# Patient Record
(demographics unavailable — no encounter records)

---

## 2024-10-17 NOTE — PHYSICAL EXAM
[No Carotid Bruits] : no carotid bruits [No Abdominal Bruit] : a ~M bruit was not heard ~T in the abdomen [No Palpable Aorta] : no palpable aorta [Normal] : soft, non-tender, non-distended, no masses palpated, no HSM and normal bowel sounds [Normal Inguinal Nodes] : no inguinal lymphadenopathy [Normal Femoral Nodes] : no femoral lymphadenopathy [de-identified] : Trace edema [de-identified] : Half centimeter avulsion healing well below 5 cm avulsion injury healing well no discharge minimal redness

## 2024-10-17 NOTE — HISTORY OF PRESENT ILLNESS
[FreeTextEntry8] :   CC: Avulsion injury of the skin of the right shin  HPI: The patient is a healthy 72-year-old female with history of hypothyroidism, macrocytosis, Hyperlipidemia who presents for dangle her leg against the bed with avulsion injury to the skin of right shin patient was seen in urgent care center diagnosed with secondary cellulitis started on Cefadroxil with improvement now presents for follow-up patient notes some tenderness mild swelling minimal redness no discharge no fever chills.

## 2024-10-17 NOTE — ASSESSMENT
[FreeTextEntry1] : Day healthy 72-year-old female with history of hypothyroidism, hyperlipidemia, macrocytosis and osteoarthritis who presents for avulsion injury of skin to right shin  1.  Right shin avulsion injury of the skin Finish antibiotic Continue Bactroban ointment Nonadhesive dressings Observe  2.  Health maintenance Flu shot today high-dose  3 hypothyroidism Continue decreased dosage of levothyroxine 88 mics check TFTs in 4 weeks  4.  Macrocytosis Check CBC in 4 weeks

## 2024-12-20 NOTE — REVIEW OF SYSTEMS
[Anxiety] : anxiety [Negative] : Neurological [Suicidal] : not suicidal [Insomnia] : no insomnia [Depression] : no depression

## 2024-12-20 NOTE — HISTORY OF PRESENT ILLNESS
[FreeTextEntry1] : Follow-up for anxiety depression, hypothyroidism, macrocytosis. [de-identified] : Patient is a 72-year-old female with history of depression has  with frontal lobe dementia, hypothyroidism, hyperlipidemia, macrocytosis who presents for follow-up.  Patient has noticed some improvement with Lexapro 10 mg and Xanax but notes still very anxious  with frontotemporal dementia.  She denies chest pain, shortness of breath alcohol use insomnia.

## 2024-12-20 NOTE — HISTORY OF PRESENT ILLNESS
[FreeTextEntry1] : Follow-up for anxiety depression, hypothyroidism, macrocytosis. [de-identified] : Patient is a 72-year-old female with history of depression has  with frontal lobe dementia, hypothyroidism, hyperlipidemia, macrocytosis who presents for follow-up.  Patient has noticed some improvement with Lexapro 10 mg and Xanax but notes still very anxious  with frontotemporal dementia.  She denies chest pain, shortness of breath alcohol use insomnia.

## 2024-12-20 NOTE — ASSESSMENT
[FreeTextEntry1] : Patient is a 72-year-old female with history of anxiety/depression, hypothyroidism, macrocytosis hyperlipidemia osteoarthritis of the hands who presents for follow-up  1.  Anxiety/depression Increase Lexapro to 15 mg a day Continue Xanax as needed Discussed relaxation techniques will observe follow-up in 2 months  2.  Macrocytosis Patient states only drinks 1 to 2 glasses of wine a week May be secondary thyroids rule out mild dysplastic syndrome Check CBC  3.  Hypothyroidism Continue levothyroxine 88 mcg daily check TFTs  4.  Hyperlipidemia Continue rosuvastatin 10 mg once a day. LDL in August 71